# Patient Record
Sex: MALE | Race: WHITE | NOT HISPANIC OR LATINO | URBAN - METROPOLITAN AREA
[De-identification: names, ages, dates, MRNs, and addresses within clinical notes are randomized per-mention and may not be internally consistent; named-entity substitution may affect disease eponyms.]

---

## 2017-05-04 ENCOUNTER — INPATIENT (INPATIENT)
Facility: HOSPITAL | Age: 52
LOS: 1 days | Discharge: HOME | DRG: 812 | End: 2017-05-06
Attending: INTERNAL MEDICINE | Admitting: INTERNAL MEDICINE
Payer: MEDICAID

## 2017-05-04 VITALS
HEART RATE: 101 BPM | OXYGEN SATURATION: 98 % | RESPIRATION RATE: 16 BRPM | DIASTOLIC BLOOD PRESSURE: 81 MMHG | HEIGHT: 73 IN | SYSTOLIC BLOOD PRESSURE: 150 MMHG | TEMPERATURE: 97 F | WEIGHT: 285.06 LBS

## 2017-05-04 DIAGNOSIS — C85.90 NON-HODGKIN LYMPHOMA, UNSPECIFIED, UNSPECIFIED SITE: ICD-10-CM

## 2017-05-04 DIAGNOSIS — D64.9 ANEMIA, UNSPECIFIED: ICD-10-CM

## 2017-05-04 DIAGNOSIS — R06.09 OTHER FORMS OF DYSPNEA: ICD-10-CM

## 2017-05-04 DIAGNOSIS — Z29.9 ENCOUNTER FOR PROPHYLACTIC MEASURES, UNSPECIFIED: ICD-10-CM

## 2017-05-04 DIAGNOSIS — C82.91 FOLLICULAR LYMPHOMA, UNSPECIFIED, LYMPH NODES OF HEAD, FACE, AND NECK: Chronic | ICD-10-CM

## 2017-05-04 DIAGNOSIS — K21.9 GASTRO-ESOPHAGEAL REFLUX DISEASE WITHOUT ESOPHAGITIS: ICD-10-CM

## 2017-05-04 LAB
ALBUMIN SERPL ELPH-MCNC: 2.6 G/DL — LOW (ref 3.5–5)
ALP SERPL-CCNC: 57 U/L — SIGNIFICANT CHANGE UP (ref 40–120)
ALT FLD-CCNC: 35 U/L DA — SIGNIFICANT CHANGE UP (ref 10–60)
ANION GAP SERPL CALC-SCNC: 7 MMOL/L — SIGNIFICANT CHANGE UP (ref 5–17)
APTT BLD: 30.1 SEC — SIGNIFICANT CHANGE UP (ref 27.5–37.4)
AST SERPL-CCNC: 22 U/L — SIGNIFICANT CHANGE UP (ref 10–40)
BASOPHILS # BLD AUTO: 0.1 K/UL — SIGNIFICANT CHANGE UP (ref 0–0.2)
BASOPHILS NFR BLD AUTO: 0.9 % — SIGNIFICANT CHANGE UP (ref 0–2)
BILIRUB SERPL-MCNC: 0.3 MG/DL — SIGNIFICANT CHANGE UP (ref 0.2–1.2)
BUN SERPL-MCNC: 17 MG/DL — SIGNIFICANT CHANGE UP (ref 7–18)
CALCIUM SERPL-MCNC: 7.7 MG/DL — LOW (ref 8.4–10.5)
CHLORIDE SERPL-SCNC: 112 MMOL/L — HIGH (ref 96–108)
CK MB BLD-MCNC: 0.8 % — SIGNIFICANT CHANGE UP (ref 0–3.5)
CK MB CFR SERPL CALC: 1.5 NG/ML — SIGNIFICANT CHANGE UP (ref 0–3.6)
CK SERPL-CCNC: 195 U/L — SIGNIFICANT CHANGE UP (ref 35–232)
CO2 SERPL-SCNC: 25 MMOL/L — SIGNIFICANT CHANGE UP (ref 22–31)
CREAT SERPL-MCNC: 1.22 MG/DL — SIGNIFICANT CHANGE UP (ref 0.5–1.3)
EOSINOPHIL # BLD AUTO: 0.3 K/UL — SIGNIFICANT CHANGE UP (ref 0–0.5)
EOSINOPHIL NFR BLD AUTO: 3.3 % — SIGNIFICANT CHANGE UP (ref 0–6)
FERRITIN SERPL-MCNC: 18 NG/ML — LOW (ref 30–400)
GLUCOSE SERPL-MCNC: 125 MG/DL — HIGH (ref 70–99)
HAPTOGLOB SERPL-MCNC: 158 MG/DL — SIGNIFICANT CHANGE UP (ref 34–200)
HCT VFR BLD CALC: 21.4 % — LOW (ref 39–50)
HCT VFR BLD CALC: 24.2 % — LOW (ref 39–50)
HGB BLD-MCNC: 6.9 G/DL — CRITICAL LOW (ref 13–17)
HGB BLD-MCNC: 7.7 G/DL — LOW (ref 13–17)
INR BLD: 1.05 RATIO — SIGNIFICANT CHANGE UP (ref 0.88–1.16)
IRON SATN MFR SERPL: 20 UG/DL — LOW (ref 65–170)
IRON SATN MFR SERPL: 6 % — LOW (ref 20–55)
LDH SERPL L TO P-CCNC: 215 U/L — SIGNIFICANT CHANGE UP (ref 120–225)
LYMPHOCYTES # BLD AUTO: 1.5 K/UL — SIGNIFICANT CHANGE UP (ref 1–3.3)
LYMPHOCYTES # BLD AUTO: 16.4 % — SIGNIFICANT CHANGE UP (ref 13–44)
MCHC RBC-ENTMCNC: 30.4 PG — SIGNIFICANT CHANGE UP (ref 27–34)
MCHC RBC-ENTMCNC: 31.2 PG — SIGNIFICANT CHANGE UP (ref 27–34)
MCHC RBC-ENTMCNC: 32 GM/DL — SIGNIFICANT CHANGE UP (ref 32–36)
MCHC RBC-ENTMCNC: 32 GM/DL — SIGNIFICANT CHANGE UP (ref 32–36)
MCV RBC AUTO: 95 FL — SIGNIFICANT CHANGE UP (ref 80–100)
MCV RBC AUTO: 97.3 FL — SIGNIFICANT CHANGE UP (ref 80–100)
MONOCYTES # BLD AUTO: 0.8 K/UL — SIGNIFICANT CHANGE UP (ref 0–0.9)
MONOCYTES NFR BLD AUTO: 8.3 % — SIGNIFICANT CHANGE UP (ref 2–14)
NEUTROPHILS # BLD AUTO: 6.7 K/UL — SIGNIFICANT CHANGE UP (ref 1.8–7.4)
NEUTROPHILS NFR BLD AUTO: 71.1 % — SIGNIFICANT CHANGE UP (ref 43–77)
NT-PROBNP SERPL-SCNC: 180 PG/ML — HIGH (ref 0–125)
OB PNL STL: NEGATIVE — SIGNIFICANT CHANGE UP
PLATELET # BLD AUTO: 330 K/UL — SIGNIFICANT CHANGE UP (ref 150–400)
PLATELET # BLD AUTO: 333 K/UL — SIGNIFICANT CHANGE UP (ref 150–400)
POTASSIUM SERPL-MCNC: 4 MMOL/L — SIGNIFICANT CHANGE UP (ref 3.5–5.3)
POTASSIUM SERPL-SCNC: 4 MMOL/L — SIGNIFICANT CHANGE UP (ref 3.5–5.3)
PROT SERPL-MCNC: 5.7 G/DL — LOW (ref 6–8.3)
PROTHROM AB SERPL-ACNC: 11.5 SEC — SIGNIFICANT CHANGE UP (ref 9.8–12.7)
RBC # BLD: 2.07 M/UL — LOW (ref 4.2–5.8)
RBC # BLD: 2.2 M/UL — LOW (ref 4.2–5.8)
RBC # BLD: 2.54 M/UL — LOW (ref 4.2–5.8)
RBC # FLD: 14.8 % — HIGH (ref 10.3–14.5)
RBC # FLD: 15.6 % — HIGH (ref 10.3–14.5)
RETICS #: 173.3 K/UL — HIGH (ref 25–125)
RETICS/RBC NFR: 8.4 % — HIGH (ref 0.5–2.5)
SODIUM SERPL-SCNC: 144 MMOL/L — SIGNIFICANT CHANGE UP (ref 135–145)
TIBC SERPL-MCNC: 340 UG/DL — SIGNIFICANT CHANGE UP (ref 250–450)
TRANSFERRIN SERPL-MCNC: 267 MG/DL — SIGNIFICANT CHANGE UP (ref 200–360)
TROPONIN I SERPL-MCNC: <0.015 NG/ML — SIGNIFICANT CHANGE UP (ref 0–0.04)
UIBC SERPL-MCNC: 320 UG/DL — SIGNIFICANT CHANGE UP (ref 110–370)
WBC # BLD: 9.4 K/UL — SIGNIFICANT CHANGE UP (ref 3.8–10.5)
WBC # BLD: 9.6 K/UL — SIGNIFICANT CHANGE UP (ref 3.8–10.5)
WBC # FLD AUTO: 9.4 K/UL — SIGNIFICANT CHANGE UP (ref 3.8–10.5)
WBC # FLD AUTO: 9.6 K/UL — SIGNIFICANT CHANGE UP (ref 3.8–10.5)

## 2017-05-04 PROCEDURE — 88312 SPECIAL STAINS GROUP 1: CPT | Mod: 26

## 2017-05-04 PROCEDURE — 99223 1ST HOSP IP/OBS HIGH 75: CPT | Mod: GC

## 2017-05-04 PROCEDURE — 71260 CT THORAX DX C+: CPT | Mod: 26

## 2017-05-04 PROCEDURE — 74177 CT ABD & PELVIS W/CONTRAST: CPT | Mod: 26

## 2017-05-04 PROCEDURE — 71020: CPT | Mod: 26

## 2017-05-04 PROCEDURE — 99285 EMERGENCY DEPT VISIT HI MDM: CPT

## 2017-05-04 PROCEDURE — 88305 TISSUE EXAM BY PATHOLOGIST: CPT | Mod: 26

## 2017-05-04 RX ORDER — SODIUM CHLORIDE 9 MG/ML
1000 INJECTION INTRAMUSCULAR; INTRAVENOUS; SUBCUTANEOUS
Qty: 0 | Refills: 0 | Status: DISCONTINUED | OUTPATIENT
Start: 2017-05-04 | End: 2017-05-06

## 2017-05-04 RX ORDER — SOD SULF/SODIUM/NAHCO3/KCL/PEG
4000 SOLUTION, RECONSTITUTED, ORAL ORAL ONCE
Qty: 0 | Refills: 0 | Status: COMPLETED | OUTPATIENT
Start: 2017-05-04 | End: 2017-05-04

## 2017-05-04 RX ORDER — PANTOPRAZOLE SODIUM 20 MG/1
40 TABLET, DELAYED RELEASE ORAL EVERY 12 HOURS
Qty: 0 | Refills: 0 | Status: DISCONTINUED | OUTPATIENT
Start: 2017-05-04 | End: 2017-05-06

## 2017-05-04 RX ORDER — MIRTAZAPINE 45 MG/1
1 TABLET, ORALLY DISINTEGRATING ORAL
Qty: 0 | Refills: 0 | COMMUNITY

## 2017-05-04 RX ORDER — INFLUENZA VIRUS VACCINE 15; 15; 15; 15 UG/.5ML; UG/.5ML; UG/.5ML; UG/.5ML
0.5 SUSPENSION INTRAMUSCULAR ONCE
Qty: 0 | Refills: 0 | Status: COMPLETED | OUTPATIENT
Start: 2017-05-04 | End: 2017-05-04

## 2017-05-04 RX ADMIN — PANTOPRAZOLE SODIUM 40 MILLIGRAM(S): 20 TABLET, DELAYED RELEASE ORAL at 18:55

## 2017-05-04 RX ADMIN — Medication 4000 MILLILITER(S): at 23:13

## 2017-05-04 RX ADMIN — Medication 20 MILLIGRAM(S): at 22:58

## 2017-05-04 RX ADMIN — SODIUM CHLORIDE 100 MILLILITER(S): 9 INJECTION INTRAMUSCULAR; INTRAVENOUS; SUBCUTANEOUS at 18:55

## 2017-05-04 NOTE — PATIENT PROFILE ADULT. - VISION (WITH CORRECTIVE LENSES IF THE PATIENT USUALLY WEARS THEM):
Normal vision: sees adequately in most situations; can see medication labels, newsprint/WEARS GLASSES

## 2017-05-04 NOTE — H&P ADULT. - HISTORY OF PRESENT ILLNESS
Patient is a 52 y/o male with PMHx of Follicular Lymphoma S/p surgery (no chemo or radiation) , gerd was sent in by pmd to the ED for low hemoglobin on labs done on may 1 . As per pmd patient came to see her on may 1 for complaints of worsening shortness of breath , fatigue , crampy abdominal discomfort and increased frequency of bowel movements . Patient gives a similar history and in addition denies any loose stools, blood in stool/vomitus/urine , easy bruising,fevers, weight loss, night sweats , worsening abdominal distention or any other symptoms. He also denies any chest pain, palpitations,swelling of legs.No fevers , chills or dysuria.No cough or plueritic chest pain. no nausea/vomiting . No recent travel or sick contacts . He however admits to some weight gain in past few months.Patient has history of follicular lymphoma stage 1 for which he had a surgery 4 years ago without any chemotherapy or radiation and has not seen his oncologist in a couple of years. Family h/o  significant for prostrate malignancy in father . Patient is a non smoker & non alcoholic, lives with friends and ambulates normally at baseline . Last colonoscopy 5 years ago and last endoscopy 3 years ago are both normal.As per pmd patient does not have any other complaints and only medication prescribed is ranitidine.

## 2017-05-04 NOTE — H&P ADULT. - NEGATIVE OPHTHALMOLOGIC SYMPTOMS
no diplopia/no photophobia/no blurred vision L/no lacrimation R/no blurred vision R/no lacrimation L

## 2017-05-04 NOTE — H&P ADULT. - NEGATIVE NEUROLOGICAL SYMPTOMS
no hemiparesis/no focal seizures/no loss of consciousness/no weakness/no difficulty walking/no syncope/no facial palsy/no loss of sensation/no tremors/no headache/no paresthesias/no transient paralysis/no vertigo/no confusion/no generalized seizures

## 2017-05-04 NOTE — H&P ADULT. - ASSESSMENT
Patient is a 52 y/o male with PMHx of Follicular Lymphoma S/p surgery (no chemo or radiation) , gerd was sent in by pmd to the ED for low hemoglobin on labs done on may 1 associated with fatigue and dyspnea on exertion. In the ed vitals are significant for mild tachycardia on admission with a normal blood pressure. Physical exam is significant for softly distended abdomen with bowel sounds present . Labs are significant for h/h of 6.9/21.4 with normal liver function tests and coagulation profile. Patient is admitted for symptomatic anemia requiring blood transfusion and further work up

## 2017-05-04 NOTE — H&P ADULT. - NEGATIVE GASTROINTESTINAL SYMPTOMS
no steatorrhea/no constipation/no abdominal pain/no change in bowel habits/no hiccoughs/no jaundice/no nausea/no vomiting/no hematochezia/no melena/no flatulence/no diarrhea

## 2017-05-04 NOTE — H&P ADULT. - MUSCULOSKELETAL
details… detailed exam ROM intact/no joint swelling/no joint warmth/no joint erythema/normal strength/normal/no calf tenderness

## 2017-05-04 NOTE — H&P ADULT. - PROBLEM SELECTOR PLAN 1
Normocytic anemia with H/H of 6.9/21.4 on admission  Concern for gi bleed versus secondary to malignancy  Glasglow blatchford score of 7 indicating need for further intervention nessecary including endoscopy and blood transfusion   baseline Hemoglobin for patient from march is 15 .Hb done 5 days ago in pmd office is 7.2   Fecal Occult blood test is negative both in ed and pmd office  Peripheral smear does not show any significant abnormalities  Follow-up anemia panel  Keep NPO for now  Patient is currently getting endoscopy by dr Barraza. Advance diet per GI  F/u ct abdomen, neck and chest with iv and po contrast to evaluate for malignancy  Started on protonix 40 IV Q 12  Will monitor CBC Q12  Patient to get 2 U PRBC . Follow up post transfusion cbc  Will continue Iv fluids as Orthostatic's weakly positive and repeat Orthostatic's S/p fluid resuscitation  Dr clemens to be consulted

## 2017-05-04 NOTE — H&P ADULT. - PROBLEM SELECTOR PLAN 5
Dvt & gi prophylaxis - improve score = 1. Will hold chemical dvt prophylaxis as concern for gi bleed

## 2017-05-04 NOTE — H&P ADULT. - GASTROINTESTINAL DETAILS
bowel sounds normal/no rigidity/normal/no rebound tenderness/no distention/no guarding/soft/nontender/no masses palpable

## 2017-05-04 NOTE — H&P ADULT. - NEUROLOGICAL DETAILS
responds to verbal commands/superficial reflexes intact/cranial nerves intact/deep reflexes intact/normal strength/alert and oriented x 3/responds to pain/sensation intact/no spontaneous movement

## 2017-05-04 NOTE — H&P ADULT. - NEGATIVE ENMT SYMPTOMS
no sinus symptoms/no vertigo/no nasal congestion/no tinnitus/no hearing difficulty/no ear pain/no nasal discharge

## 2017-05-04 NOTE — H&P ADULT. - PROBLEM SELECTOR PLAN 3
Patient has history of follicular lymphoma stage 1 for which he had a surgery 4 years ago without any chemotherapy or radiation and has not seen his oncologist in a couple of years.  F/u ct abdomen, neck and chest with iv and po contrast to evaluate for malignancy . Dr clemens to be consulted

## 2017-05-04 NOTE — H&P ADULT. - ATTENDING COMMENTS
Patient seen and examined and d/w PGY2 MAR Dr. Ball presentation    Patient is a 50 y/o male with PMHx of Follicular Lymphoma S/p surgery (no chemo or radiation) , gerd was sent in by pmd to the ED for low hemoglobin on labs done on may 1 . As per pmd patient came to see her on may 1 for complaints of worsening shortness of breath , fatigue , crampy abdominal discomfort and increased frequency of bowel movements . Patient gives a similar history and in addition denies any loose stools, blood in stool/vomitus/urine , easy bruising,fevers, weight loss, night sweats , worsening abdominal distention or any other symptoms. He also denies any chest pain, palpitations,swelling of legs.No fevers , chills or dysuria.No cough or plueritic chest pain. no nausea/vomiting . No recent travel or sick contacts . He however admits to some weight gain in past few months.Patient has history of follicular lymphoma stage 1 for which he had a surgery 4 years ago without any chemotherapy or radiation and has not seen his oncologist in a couple of years. Family h/o  significant for prostrate malignancy in father . Patient is a non smoker & non alcoholic, lives with friends and ambulates normally at baseline . Last colonoscopy 5 years ago and last endoscopy 3 years ago are both normal. As per pmd patient does not have any other complaints and only medication prescribed is ranitidine.    Patient was seen by GI this afternoon underwent EGD showed gastric ulcer non bleeding. Patient was on ranitidine at home.  Patient denies any blood in stool or dark stool.     Patient found to have very low H/H 7/21 in ED, Anemia panel requested and currently getting first unit of blood.    D/D:  Symptomatic Anemia etiology unclear GI bleed versus Lymphoma   versus Bone marrow etiology  gastric ulcer concern for H. Pylori with underlying MALT/ GIST etc    Plan:  Admit to Medicine; NPO; CT Neck, Chest, Abdomen and Pelvis urgent  s/p EGD with no active bleed; d/w GI for Colonoscopy tomorrow. Bowel prep after CT  Hematology evaluation d/w Dr. Andrew Smith. Transfuse 2 units pRBC; Keep H/H around 9/27;  No Lovenox or heparin due to low H/H; SCD boots    Discussed with PGY2 MAR plan of care; d/w Floor RN  Discussed with Patient findings, plan of care Patient seen and examined and d/w PGY2 MAR Dr. Ball presentation    Patient is a 52 y/o male with PMHx of Follicular Lymphoma S/p surgery (no chemo or radiation) , gerd was sent in by pmd to the ED for low hemoglobin on labs done on may 1 . As per pmd patient came to see her on may 1 for complaints of worsening shortness of breath , fatigue , crampy abdominal discomfort and increased frequency of bowel movements . Patient gives a similar history and in addition denies any loose stools, blood in stool/vomitus/urine , easy bruising,fevers, weight loss, night sweats , worsening abdominal distention or any other symptoms. He also denies any chest pain, palpitations,swelling of legs.No fevers , chills or dysuria.No cough or plueritic chest pain. no nausea/vomiting . No recent travel or sick contacts . He however admits to some weight gain in past few months.Patient has history of follicular lymphoma stage 1 for which he had a surgery 4 years ago without any chemotherapy or radiation and has not seen his oncologist in a couple of years. Family h/o  significant for prostrate malignancy in father . Patient is a non smoker & non alcoholic, lives with friends and ambulates normally at baseline . Last colonoscopy 5 years ago and last endoscopy 3 years ago are both normal. As per pmd patient does not have any other complaints and only medication prescribed is ranitidine.    Patient was seen by GI this afternoon underwent EGD showed gastric ulcer non bleeding. Patient was on ranitidine at home.  Patient denies any blood in stool or dark stool.     Patient found to have very low H/H 7/21 in ED, Anemia panel requested and currently getting first unit of blood.    Labs: reviewed; low H/H; FOBT Negative; BMP WNL    D/D:  Symptomatic Anemia etiology unclear GI bleed versus Lymphoma   versus Bone marrow etiology  gastric ulcer concern for H. Pylori with underlying MALT/ GIST etc    Plan:  Admit to Medicine; NPO; CT Neck, Chest, Abdomen and Pelvis urgent  s/p EGD with no active bleed; d/w GI for Colonoscopy tomorrow. Bowel prep after CT  Hematology evaluation d/w Dr. Andrew Smith. Transfuse 2 units pRBC; Keep H/H around 9/27;  No Lovenox or heparin due to low H/H; SCD boots    Discussed with PGY2 MAR plan of care; d/w Floor RN  Discussed with Patient findings, plan of care Patient seen and examined and d/w PGY2 EMILIE Ball presentation    Patient is a 50 y/o male with PMHx of Follicular Lymphoma S/p surgery (no chemo or radiation) , gerd was sent in by pmd to the ED for low hemoglobin on labs done on may 1 . As per pmd patient came to see her on may 1 for complaints of worsening shortness of breath , fatigue , crampy abdominal discomfort and increased frequency of bowel movements . Patient gives a similar history and in addition denies any loose stools, blood in stool/vomitus/urine , easy bruising,fevers, weight loss, night sweats , worsening abdominal distention or any other symptoms. He also denies any chest pain, palpitations,swelling of legs.No fevers , chills or dysuria.No cough or plueritic chest pain. no nausea/vomiting . No recent travel or sick contacts . He however admits to some weight gain in past few months.Patient has history of follicular lymphoma stage 1 for which he had a surgery 4 years ago without any chemotherapy or radiation and has not seen his oncologist in a couple of years. Family h/o  significant for prostrate malignancy in father . Patient is a non smoker & non alcoholic, lives with friends and ambulates normally at baseline . Last colonoscopy 5 years ago and last endoscopy 3 years ago are both normal. As per pmd patient does not have any other complaints and only medication prescribed is ranitidine.    Patient was seen by GI this afternoon underwent EGD showed gastric ulcer non bleeding. Patient was on ranitidine at home.  Patient denies any blood in stool or dark stool.     Patient found to have very low H/H 7/21 in ED, Anemia panel requested and currently getting first unit of blood.    Labs: reviewed; low H/H; FOBT Negative; BMP WNL    D/D:  Symptomatic Anemia etiology unclear GI bleed versus Lymphoma   versus Bone marrow etiology  gastric ulcer concern for H. Pylori with underlying MALT/ GIST etc    Plan:  Admit to Medicine; NPO; CT Neck, Chest, Abdomen and Pelvis urgent  s/p EGD with no active bleed; d/w GI for Colonoscopy tomorrow. Bowel prep after CT  Hematology evaluation d/w Dr. Andrew Smith. Transfuse 2 units pRBC; Keep H/H around 9/27;  No Lovenox or heparin due to low H/H; SCD boots  If no evident etiology on endoscopy and labs, will need Bone marrow Biopsy    Discussed with PGY2 MAR plan of care; d/w Floor RN  Discussed with Patient findings, plan of care

## 2017-05-04 NOTE — ED PROVIDER NOTE - OBJECTIVE STATEMENT
52 y/o male with PMHx of Folliculare Lymphoma (previously on chemo and radiation) presents to the ED for low hemoglobin today. Pt reports that for 2 days ago he went to his PMD for feeling weak and tired, he then had his blood drawn and today got a call stating that his hemoglobin was very low. Pt was sent by PMD to the ED for transfusion today. Pt denies fever, chills, weight loss, night sweats, similar episodes in the past, or any other complaints. NKDA.

## 2017-05-04 NOTE — H&P ADULT. - NEGATIVE CARDIOVASCULAR SYMPTOMS
no palpitations/no paroxysmal nocturnal dyspnea/no chest pain/no claudication/no peripheral edema/no orthopnea

## 2017-05-05 LAB
ALBUMIN SERPL ELPH-MCNC: 2.7 G/DL — LOW (ref 3.5–5)
ALP SERPL-CCNC: 57 U/L — SIGNIFICANT CHANGE UP (ref 40–120)
ALT FLD-CCNC: 34 U/L DA — SIGNIFICANT CHANGE UP (ref 10–60)
ANION GAP SERPL CALC-SCNC: 7 MMOL/L — SIGNIFICANT CHANGE UP (ref 5–17)
AST SERPL-CCNC: 19 U/L — SIGNIFICANT CHANGE UP (ref 10–40)
BASOPHILS # BLD AUTO: 0.1 K/UL — SIGNIFICANT CHANGE UP (ref 0–0.2)
BASOPHILS NFR BLD AUTO: 0.7 % — SIGNIFICANT CHANGE UP (ref 0–2)
BILIRUB SERPL-MCNC: 0.5 MG/DL — SIGNIFICANT CHANGE UP (ref 0.2–1.2)
BUN SERPL-MCNC: 10 MG/DL — SIGNIFICANT CHANGE UP (ref 7–18)
CALCIUM SERPL-MCNC: 7.7 MG/DL — LOW (ref 8.4–10.5)
CHLORIDE SERPL-SCNC: 110 MMOL/L — HIGH (ref 96–108)
CHOLEST SERPL-MCNC: 162 MG/DL — SIGNIFICANT CHANGE UP (ref 10–199)
CO2 SERPL-SCNC: 26 MMOL/L — SIGNIFICANT CHANGE UP (ref 22–31)
CREAT SERPL-MCNC: 1.05 MG/DL — SIGNIFICANT CHANGE UP (ref 0.5–1.3)
EOSINOPHIL # BLD AUTO: 0.3 K/UL — SIGNIFICANT CHANGE UP (ref 0–0.5)
EOSINOPHIL NFR BLD AUTO: 3.7 % — SIGNIFICANT CHANGE UP (ref 0–6)
GLUCOSE SERPL-MCNC: 84 MG/DL — SIGNIFICANT CHANGE UP (ref 70–99)
HBA1C BLD-MCNC: 5.5 % — SIGNIFICANT CHANGE UP (ref 4–5.6)
HCT VFR BLD CALC: 24.3 % — LOW (ref 39–50)
HCT VFR BLD CALC: 27.3 % — LOW (ref 39–50)
HDLC SERPL-MCNC: 30 MG/DL — LOW (ref 40–125)
HGB BLD-MCNC: 7.8 G/DL — LOW (ref 13–17)
HGB BLD-MCNC: 8.8 G/DL — LOW (ref 13–17)
INR BLD: 1.07 RATIO — SIGNIFICANT CHANGE UP (ref 0.88–1.16)
LIPID PNL WITH DIRECT LDL SERPL: 108 MG/DL — SIGNIFICANT CHANGE UP
LYMPHOCYTES # BLD AUTO: 1.6 K/UL — SIGNIFICANT CHANGE UP (ref 1–3.3)
LYMPHOCYTES # BLD AUTO: 17.2 % — SIGNIFICANT CHANGE UP (ref 13–44)
MAGNESIUM SERPL-MCNC: 2.5 MG/DL — HIGH (ref 1.8–2.4)
MCHC RBC-ENTMCNC: 30.4 PG — SIGNIFICANT CHANGE UP (ref 27–34)
MCHC RBC-ENTMCNC: 30.6 PG — SIGNIFICANT CHANGE UP (ref 27–34)
MCHC RBC-ENTMCNC: 32.1 GM/DL — SIGNIFICANT CHANGE UP (ref 32–36)
MCHC RBC-ENTMCNC: 32.3 GM/DL — SIGNIFICANT CHANGE UP (ref 32–36)
MCV RBC AUTO: 94 FL — SIGNIFICANT CHANGE UP (ref 80–100)
MCV RBC AUTO: 95.3 FL — SIGNIFICANT CHANGE UP (ref 80–100)
MONOCYTES # BLD AUTO: 0.8 K/UL — SIGNIFICANT CHANGE UP (ref 0–0.9)
MONOCYTES NFR BLD AUTO: 8.6 % — SIGNIFICANT CHANGE UP (ref 2–14)
NEUTROPHILS # BLD AUTO: 6.3 K/UL — SIGNIFICANT CHANGE UP (ref 1.8–7.4)
NEUTROPHILS NFR BLD AUTO: 69.8 % — SIGNIFICANT CHANGE UP (ref 43–77)
PHOSPHATE SERPL-MCNC: 2 MG/DL — LOW (ref 2.5–4.5)
PLATELET # BLD AUTO: 345 K/UL — SIGNIFICANT CHANGE UP (ref 150–400)
PLATELET # BLD AUTO: 362 K/UL — SIGNIFICANT CHANGE UP (ref 150–400)
POTASSIUM SERPL-MCNC: 3.9 MMOL/L — SIGNIFICANT CHANGE UP (ref 3.5–5.3)
POTASSIUM SERPL-SCNC: 3.9 MMOL/L — SIGNIFICANT CHANGE UP (ref 3.5–5.3)
PROT SERPL-MCNC: 5.8 G/DL — LOW (ref 6–8.3)
PROTHROM AB SERPL-ACNC: 11.7 SEC — SIGNIFICANT CHANGE UP (ref 9.8–12.7)
RBC # BLD: 2.55 M/UL — LOW (ref 4.2–5.8)
RBC # BLD: 2.9 M/UL — LOW (ref 4.2–5.8)
RBC # FLD: 15.3 % — HIGH (ref 10.3–14.5)
RBC # FLD: 15.7 % — HIGH (ref 10.3–14.5)
SODIUM SERPL-SCNC: 143 MMOL/L — SIGNIFICANT CHANGE UP (ref 135–145)
TOTAL CHOLESTEROL/HDL RATIO MEASUREMENT: 5.4 RATIO — SIGNIFICANT CHANGE UP (ref 3.4–9.6)
TRIGL SERPL-MCNC: 122 MG/DL — SIGNIFICANT CHANGE UP (ref 10–149)
TSH SERPL-MCNC: 3.84 UU/ML — SIGNIFICANT CHANGE UP (ref 0.34–4.82)
VIT B12 SERPL-MCNC: 424 PG/ML — SIGNIFICANT CHANGE UP (ref 243–894)
WBC # BLD: 9.1 K/UL — SIGNIFICANT CHANGE UP (ref 3.8–10.5)
WBC # BLD: 9.3 K/UL — SIGNIFICANT CHANGE UP (ref 3.8–10.5)
WBC # FLD AUTO: 9.1 K/UL — SIGNIFICANT CHANGE UP (ref 3.8–10.5)
WBC # FLD AUTO: 9.3 K/UL — SIGNIFICANT CHANGE UP (ref 3.8–10.5)

## 2017-05-05 PROCEDURE — 99232 SBSQ HOSP IP/OBS MODERATE 35: CPT | Mod: GC

## 2017-05-05 RX ORDER — POTASSIUM PHOSPHATE, MONOBASIC POTASSIUM PHOSPHATE, DIBASIC 236; 224 MG/ML; MG/ML
15 INJECTION, SOLUTION INTRAVENOUS ONCE
Qty: 0 | Refills: 0 | Status: COMPLETED | OUTPATIENT
Start: 2017-05-05 | End: 2017-05-05

## 2017-05-05 RX ORDER — SODIUM,POTASSIUM PHOSPHATES 278-250MG
1 POWDER IN PACKET (EA) ORAL
Qty: 0 | Refills: 0 | Status: DISCONTINUED | OUTPATIENT
Start: 2017-05-05 | End: 2017-05-05

## 2017-05-05 RX ADMIN — PANTOPRAZOLE SODIUM 40 MILLIGRAM(S): 20 TABLET, DELAYED RELEASE ORAL at 05:44

## 2017-05-05 RX ADMIN — Medication 20 MILLIGRAM(S): at 21:54

## 2017-05-05 RX ADMIN — POTASSIUM PHOSPHATE, MONOBASIC POTASSIUM PHOSPHATE, DIBASIC 62.5 MILLIMOLE(S): 236; 224 INJECTION, SOLUTION INTRAVENOUS at 09:20

## 2017-05-05 RX ADMIN — PANTOPRAZOLE SODIUM 40 MILLIGRAM(S): 20 TABLET, DELAYED RELEASE ORAL at 18:24

## 2017-05-06 VITALS
OXYGEN SATURATION: 99 % | DIASTOLIC BLOOD PRESSURE: 71 MMHG | SYSTOLIC BLOOD PRESSURE: 139 MMHG | HEART RATE: 97 BPM | TEMPERATURE: 98 F | RESPIRATION RATE: 15 BRPM

## 2017-05-06 LAB
ANION GAP SERPL CALC-SCNC: 6 MMOL/L — SIGNIFICANT CHANGE UP (ref 5–17)
BUN SERPL-MCNC: 9 MG/DL — SIGNIFICANT CHANGE UP (ref 7–18)
CALCIUM SERPL-MCNC: 7.9 MG/DL — LOW (ref 8.4–10.5)
CHLORIDE SERPL-SCNC: 110 MMOL/L — HIGH (ref 96–108)
CO2 SERPL-SCNC: 28 MMOL/L — SIGNIFICANT CHANGE UP (ref 22–31)
CREAT SERPL-MCNC: 1.13 MG/DL — SIGNIFICANT CHANGE UP (ref 0.5–1.3)
DIR ANTIGLOB POLYSPECIFIC INTERPRETATION: SIGNIFICANT CHANGE UP
ERYTHROCYTE [SEDIMENTATION RATE] IN BLOOD: 34 MM/HR — HIGH (ref 0–20)
GLUCOSE SERPL-MCNC: 79 MG/DL — SIGNIFICANT CHANGE UP (ref 70–99)
HCT VFR BLD CALC: 27.4 % — LOW (ref 39–50)
HGB BLD-MCNC: 9 G/DL — LOW (ref 13–17)
MCHC RBC-ENTMCNC: 31 PG — SIGNIFICANT CHANGE UP (ref 27–34)
MCHC RBC-ENTMCNC: 32.8 GM/DL — SIGNIFICANT CHANGE UP (ref 32–36)
MCV RBC AUTO: 94.3 FL — SIGNIFICANT CHANGE UP (ref 80–100)
PLATELET # BLD AUTO: 364 K/UL — SIGNIFICANT CHANGE UP (ref 150–400)
POTASSIUM SERPL-MCNC: 4 MMOL/L — SIGNIFICANT CHANGE UP (ref 3.5–5.3)
POTASSIUM SERPL-SCNC: 4 MMOL/L — SIGNIFICANT CHANGE UP (ref 3.5–5.3)
RBC # BLD: 2.91 M/UL — LOW (ref 4.2–5.8)
RBC # FLD: 15.5 % — HIGH (ref 10.3–14.5)
SODIUM SERPL-SCNC: 144 MMOL/L — SIGNIFICANT CHANGE UP (ref 135–145)
WBC # BLD: 9.8 K/UL — SIGNIFICANT CHANGE UP (ref 3.8–10.5)
WBC # FLD AUTO: 9.8 K/UL — SIGNIFICANT CHANGE UP (ref 3.8–10.5)

## 2017-05-06 PROCEDURE — 86850 RBC ANTIBODY SCREEN: CPT

## 2017-05-06 PROCEDURE — P9040: CPT

## 2017-05-06 PROCEDURE — 85652 RBC SED RATE AUTOMATED: CPT

## 2017-05-06 PROCEDURE — 80053 COMPREHEN METABOLIC PANEL: CPT

## 2017-05-06 PROCEDURE — 70491 CT SOFT TISSUE NECK W/DYE: CPT | Mod: 26

## 2017-05-06 PROCEDURE — 85610 PROTHROMBIN TIME: CPT

## 2017-05-06 PROCEDURE — 84466 ASSAY OF TRANSFERRIN: CPT

## 2017-05-06 PROCEDURE — 86900 BLOOD TYPING SEROLOGIC ABO: CPT

## 2017-05-06 PROCEDURE — 80048 BASIC METABOLIC PNL TOTAL CA: CPT

## 2017-05-06 PROCEDURE — 85027 COMPLETE CBC AUTOMATED: CPT

## 2017-05-06 PROCEDURE — 80061 LIPID PANEL: CPT

## 2017-05-06 PROCEDURE — 71046 X-RAY EXAM CHEST 2 VIEWS: CPT

## 2017-05-06 PROCEDURE — 86901 BLOOD TYPING SEROLOGIC RH(D): CPT

## 2017-05-06 PROCEDURE — 88312 SPECIAL STAINS GROUP 1: CPT

## 2017-05-06 PROCEDURE — 84100 ASSAY OF PHOSPHORUS: CPT

## 2017-05-06 PROCEDURE — 82607 VITAMIN B-12: CPT

## 2017-05-06 PROCEDURE — 83615 LACTATE (LD) (LDH) ENZYME: CPT

## 2017-05-06 PROCEDURE — 71260 CT THORAX DX C+: CPT

## 2017-05-06 PROCEDURE — 36430 TRANSFUSION BLD/BLD COMPNT: CPT

## 2017-05-06 PROCEDURE — 82553 CREATINE MB FRACTION: CPT

## 2017-05-06 PROCEDURE — 82550 ASSAY OF CK (CPK): CPT

## 2017-05-06 PROCEDURE — 70491 CT SOFT TISSUE NECK W/DYE: CPT

## 2017-05-06 PROCEDURE — 93005 ELECTROCARDIOGRAM TRACING: CPT

## 2017-05-06 PROCEDURE — 86880 COOMBS TEST DIRECT: CPT

## 2017-05-06 PROCEDURE — 82728 ASSAY OF FERRITIN: CPT

## 2017-05-06 PROCEDURE — 83036 HEMOGLOBIN GLYCOSYLATED A1C: CPT

## 2017-05-06 PROCEDURE — 86920 COMPATIBILITY TEST SPIN: CPT

## 2017-05-06 PROCEDURE — 74177 CT ABD & PELVIS W/CONTRAST: CPT

## 2017-05-06 PROCEDURE — 85730 THROMBOPLASTIN TIME PARTIAL: CPT

## 2017-05-06 PROCEDURE — 99285 EMERGENCY DEPT VISIT HI MDM: CPT | Mod: 25

## 2017-05-06 PROCEDURE — 84443 ASSAY THYROID STIM HORMONE: CPT

## 2017-05-06 PROCEDURE — 82272 OCCULT BLD FECES 1-3 TESTS: CPT

## 2017-05-06 PROCEDURE — 83880 ASSAY OF NATRIURETIC PEPTIDE: CPT

## 2017-05-06 PROCEDURE — 85045 AUTOMATED RETICULOCYTE COUNT: CPT

## 2017-05-06 PROCEDURE — 88305 TISSUE EXAM BY PATHOLOGIST: CPT

## 2017-05-06 PROCEDURE — 84484 ASSAY OF TROPONIN QUANT: CPT

## 2017-05-06 PROCEDURE — 83010 ASSAY OF HAPTOGLOBIN QUANT: CPT

## 2017-05-06 PROCEDURE — 93306 TTE W/DOPPLER COMPLETE: CPT

## 2017-05-06 PROCEDURE — 83735 ASSAY OF MAGNESIUM: CPT

## 2017-05-06 PROCEDURE — 83550 IRON BINDING TEST: CPT

## 2017-05-06 RX ORDER — PANTOPRAZOLE SODIUM 20 MG/1
1 TABLET, DELAYED RELEASE ORAL
Qty: 30 | Refills: 0 | OUTPATIENT
Start: 2017-05-06 | End: 2017-06-05

## 2017-05-06 RX ORDER — PANTOPRAZOLE SODIUM 20 MG/1
2 TABLET, DELAYED RELEASE ORAL
Qty: 60 | Refills: 0 | OUTPATIENT
Start: 2017-05-06 | End: 2017-06-05

## 2017-05-06 RX ORDER — RANITIDINE HYDROCHLORIDE 150 MG/1
1 TABLET, FILM COATED ORAL
Qty: 0 | Refills: 0 | COMMUNITY

## 2017-05-06 RX ORDER — PANTOPRAZOLE SODIUM 20 MG/1
1 TABLET, DELAYED RELEASE ORAL
Qty: 14 | Refills: 0 | OUTPATIENT
Start: 2017-05-06 | End: 2017-05-20

## 2017-05-06 RX ADMIN — PANTOPRAZOLE SODIUM 40 MILLIGRAM(S): 20 TABLET, DELAYED RELEASE ORAL at 06:11

## 2017-05-06 NOTE — DISCHARGE NOTE ADULT - CARE PLAN
Principal Discharge DX:	Gastroesophageal reflux disease, esophagitis presence not specified  Goal:	Prevent Gi bleed  Instructions for follow-up, activity and diet:	You HB is stable now. Ct abdomen: multiple subcentimeter mesenteric lymph nodes. CT neck: No enlarged cervical adenopathy. Continue protonix for 14 days and follow up with your PCP. Colonoscopy shows some non bleeding hemorrhoids. Gi Dr Barraza recommneded Mr enterography, can be done as outpatient. Oncologist Dr Page  consulted, recommended candie test and esr and patient should follow up with his outpatient oncologist Dr Beard as outpatient and have a PET scan done.  Secondary Diagnosis:	Follicular lymphoma of lymph nodes of neck  Goal:	Prevent exacerbation  Instructions for follow-up, activity and diet:	Continue treatment as per oncologist Dr Beard

## 2017-05-06 NOTE — DISCHARGE NOTE ADULT - MEDICATION SUMMARY - MEDICATIONS TO TAKE
I will START or STAY ON the medications listed below when I get home from the hospital:    Protonix 40 mg oral granule, enteric coated  -- 1 each by mouth once a day  -- It is very important that you take or use this exactly as directed.  Do not skip doses or discontinue unless directed by your doctor.  Obtain medical advice before taking any non-prescription drugs as some may affect the action of this medication.    -- Indication: For GI bleed

## 2017-05-06 NOTE — DISCHARGE NOTE ADULT - PROVIDER TOKENS
FREE:[LAST:[Dr Beard],PHONE:[(   )    -],FAX:[(   )    -]],FREE:[LAST:[Kisha],FIRST:[Sancho],PHONE:[(   )    -],FAX:[(   )    -],ADDRESS:[[address] [place] [address]  Phone: [phone]]]

## 2017-05-06 NOTE — DISCHARGE NOTE ADULT - MEDICATION SUMMARY - MEDICATIONS TO STOP TAKING
I will STOP taking the medications listed below when I get home from the hospital:    raNITIdine 150 mg oral capsule  -- 1 tab(s) by mouth once a day

## 2017-05-06 NOTE — DISCHARGE NOTE ADULT - PLAN OF CARE
You HB is stable now. Ct abdomen: multiple subcentimeter mesenteric lymph nodes. CT neck: No enlarged cervical adenopathy. Continue protonix for 14 days and follow up with your PCP. Colonoscopy shows some non bleeding hemorrhoids. Gi Dr Barraza recommneded Mr enterography, can be done as outpatient. Oncologist Dr Page  consulted, recommended candie test and esr and patient should follow up with his outpatient oncologist Dr Beard as outpatient and have a PET scan done. Continue treatment as per oncologist Dr Beard Prevent exacerbation Prevent Gi bleed

## 2017-05-06 NOTE — DISCHARGE NOTE ADULT - CARE PROVIDER_API CALL
Dr Beard,   Phone: (   )    -  Fax: (   )    -    Sancho Beard  [address] [place] [address]  Phone: [phone]  Phone: (   )    -  Fax: (   )    -

## 2017-05-06 NOTE — DISCHARGE NOTE ADULT - HOSPITAL COURSE
[address]    Patient is a 52 y/o male with PMHx of Follicular Lymphoma S/p surgery (no chemo or radiation) , gerd was sent in by pmd to the ED for low hemoglobin on labs done on may 1 associated with fatigue and dyspnea on exertion. In the ed vitals are significant for mild tachycardia on admission with a normal blood pressure. Physical exam is significant for softly distended abdomen with bowel sounds present . Labs are significant for h/h of 6.9/21.4 with normal liver function tests and coagulation profile. Patient is admitted for symptomatic anemia requiring blood transfusion and further work up    Anemia.    H/h respoded properly to 2 prbc. HB stable  Concern for gi bleed versus secondary to malignancy   baseline Hemoglobin for patient from march is 15 .Hb done 5 days ago in pmd office is 7.2  FOBT -ve, low Ferritin, normal TIBC  Gi Dr Barraza. Advance diet per GI  Ct abdomen: multiple subcentimeter mesenteric lymph nodes  CT neck: No enlarged cervical adenopathy.  Started on protonix 40 IV Q 12, changed to oral on Discharge  Colonoscopy shows some non bleeding hemorrhoids  Gi recommneded Mr enterography, can be done as outpatient  Dr Page  consulted, recommended candie test and esr and patient should follow up with his Dr Beard as outpatient and have a PET scan done      Dyspnea on exertion.    Likely secondary to symptomatic anemia  SOB significantly improved    Lymphoma.    Patient has history of follicular lymphoma stage 1 for which he had a surgery 4 years ago without any chemotherapy or radiation and has not seen his oncologist in a couple of years.    Gastroesophageal reflux disease, continue with protonix for 14 days and follow up with PCP

## 2017-05-06 NOTE — DISCHARGE NOTE ADULT - PATIENT PORTAL LINK FT
“You can access the [facility] Patient Portal, offered by [facility], by registering with the following website: [email or web address]”

## 2017-05-09 DIAGNOSIS — D64.9 ANEMIA, UNSPECIFIED: ICD-10-CM

## 2017-05-09 DIAGNOSIS — K64.8 OTHER HEMORRHOIDS: ICD-10-CM

## 2017-05-09 DIAGNOSIS — Z85.72 PERSONAL HISTORY OF NON-HODGKIN LYMPHOMAS: ICD-10-CM

## 2017-05-09 DIAGNOSIS — R00.0 TACHYCARDIA, UNSPECIFIED: ICD-10-CM

## 2017-05-09 DIAGNOSIS — K21.9 GASTRO-ESOPHAGEAL REFLUX DISEASE WITHOUT ESOPHAGITIS: ICD-10-CM

## 2017-05-09 DIAGNOSIS — K44.9 DIAPHRAGMATIC HERNIA WITHOUT OBSTRUCTION OR GANGRENE: ICD-10-CM

## 2017-05-09 DIAGNOSIS — K25.9 GASTRIC ULCER, UNSPECIFIED AS ACUTE OR CHRONIC, WITHOUT HEMORRHAGE OR PERFORATION: ICD-10-CM

## 2019-06-27 ENCOUNTER — EMERGENCY (EMERGENCY)
Facility: HOSPITAL | Age: 54
LOS: 1 days | Discharge: HOME | End: 2019-06-27
Attending: EMERGENCY MEDICINE
Payer: MEDICAID

## 2019-06-27 VITALS
HEART RATE: 95 BPM | DIASTOLIC BLOOD PRESSURE: 107 MMHG | SYSTOLIC BLOOD PRESSURE: 158 MMHG | TEMPERATURE: 98 F | OXYGEN SATURATION: 98 % | RESPIRATION RATE: 20 BRPM | WEIGHT: 300.05 LBS | HEIGHT: 73 IN

## 2019-06-27 DIAGNOSIS — C82.91 FOLLICULAR LYMPHOMA, UNSPECIFIED, LYMPH NODES OF HEAD, FACE, AND NECK: Chronic | ICD-10-CM

## 2019-06-27 LAB
ALBUMIN SERPL ELPH-MCNC: 3.8 G/DL — SIGNIFICANT CHANGE UP (ref 3.5–5)
ALP SERPL-CCNC: 80 U/L — SIGNIFICANT CHANGE UP (ref 40–120)
ALT FLD-CCNC: 27 U/L DA — SIGNIFICANT CHANGE UP (ref 10–60)
ANION GAP SERPL CALC-SCNC: 10 MMOL/L — SIGNIFICANT CHANGE UP (ref 5–17)
APPEARANCE UR: CLEAR — SIGNIFICANT CHANGE UP
APTT BLD: 31.5 SEC — SIGNIFICANT CHANGE UP (ref 27.5–36.3)
AST SERPL-CCNC: 21 U/L — SIGNIFICANT CHANGE UP (ref 10–40)
BILIRUB DIRECT SERPL-MCNC: 0.1 MG/DL — SIGNIFICANT CHANGE UP (ref 0–0.2)
BILIRUB INDIRECT FLD-MCNC: 0.6 MG/DL — SIGNIFICANT CHANGE UP (ref 0.2–1)
BILIRUB SERPL-MCNC: 0.7 MG/DL — SIGNIFICANT CHANGE UP (ref 0.2–1.2)
BILIRUB SERPL-MCNC: 0.7 MG/DL — SIGNIFICANT CHANGE UP (ref 0.2–1.2)
BILIRUB UR-MCNC: NEGATIVE — SIGNIFICANT CHANGE UP
BUN SERPL-MCNC: 20 MG/DL — HIGH (ref 7–18)
CALCIUM SERPL-MCNC: 8.6 MG/DL — SIGNIFICANT CHANGE UP (ref 8.4–10.5)
CHLORIDE SERPL-SCNC: 104 MMOL/L — SIGNIFICANT CHANGE UP (ref 96–108)
CO2 SERPL-SCNC: 25 MMOL/L — SIGNIFICANT CHANGE UP (ref 22–31)
COLOR SPEC: YELLOW — SIGNIFICANT CHANGE UP
CREAT SERPL-MCNC: 1.77 MG/DL — HIGH (ref 0.5–1.3)
DIFF PNL FLD: ABNORMAL
EPI CELLS # UR: SIGNIFICANT CHANGE UP /HPF
GLUCOSE SERPL-MCNC: 101 MG/DL — HIGH (ref 70–99)
GLUCOSE UR QL: 1000 MG/DL
HCT VFR BLD CALC: 47.3 % — SIGNIFICANT CHANGE UP (ref 39–50)
HGB BLD-MCNC: 15.1 G/DL — SIGNIFICANT CHANGE UP (ref 13–17)
INR BLD: 1.13 RATIO — SIGNIFICANT CHANGE UP (ref 0.88–1.16)
KETONES UR-MCNC: NEGATIVE — SIGNIFICANT CHANGE UP
LEUKOCYTE ESTERASE UR-ACNC: NEGATIVE — SIGNIFICANT CHANGE UP
LIDOCAIN IGE QN: 73 U/L — SIGNIFICANT CHANGE UP (ref 73–393)
MCHC RBC-ENTMCNC: 31.1 PG — SIGNIFICANT CHANGE UP (ref 27–34)
MCHC RBC-ENTMCNC: 31.9 GM/DL — LOW (ref 32–36)
MCV RBC AUTO: 97.3 FL — SIGNIFICANT CHANGE UP (ref 80–100)
NITRITE UR-MCNC: NEGATIVE — SIGNIFICANT CHANGE UP
NRBC # BLD: 0 /100 WBCS — SIGNIFICANT CHANGE UP (ref 0–0)
PH UR: 6 — SIGNIFICANT CHANGE UP (ref 5–8)
PLATELET # BLD AUTO: 259 K/UL — SIGNIFICANT CHANGE UP (ref 150–400)
POTASSIUM SERPL-MCNC: 3.9 MMOL/L — SIGNIFICANT CHANGE UP (ref 3.5–5.3)
POTASSIUM SERPL-SCNC: 3.9 MMOL/L — SIGNIFICANT CHANGE UP (ref 3.5–5.3)
PROT SERPL-MCNC: 7.9 G/DL — SIGNIFICANT CHANGE UP (ref 6–8.3)
PROT UR-MCNC: 500 MG/DL
PROTHROM AB SERPL-ACNC: 12.6 SEC — SIGNIFICANT CHANGE UP (ref 10–12.9)
RBC # BLD: 4.86 M/UL — SIGNIFICANT CHANGE UP (ref 4.2–5.8)
RBC # FLD: 13.3 % — SIGNIFICANT CHANGE UP (ref 10.3–14.5)
RBC CASTS # UR COMP ASSIST: ABNORMAL /HPF (ref 0–2)
SODIUM SERPL-SCNC: 139 MMOL/L — SIGNIFICANT CHANGE UP (ref 135–145)
SP GR SPEC: 1.01 — SIGNIFICANT CHANGE UP (ref 1.01–1.02)
UROBILINOGEN FLD QL: NEGATIVE — SIGNIFICANT CHANGE UP
WBC # BLD: 14.51 K/UL — HIGH (ref 3.8–10.5)
WBC # FLD AUTO: 14.51 K/UL — HIGH (ref 3.8–10.5)
WBC UR QL: SIGNIFICANT CHANGE UP /HPF (ref 0–5)

## 2019-06-27 PROCEDURE — 99284 EMERGENCY DEPT VISIT MOD MDM: CPT

## 2019-06-27 RX ORDER — KETOROLAC TROMETHAMINE 30 MG/ML
15 SYRINGE (ML) INJECTION ONCE
Refills: 0 | Status: DISCONTINUED | OUTPATIENT
Start: 2019-06-27 | End: 2019-06-27

## 2019-06-27 RX ORDER — ONDANSETRON 8 MG/1
4 TABLET, FILM COATED ORAL ONCE
Refills: 0 | Status: COMPLETED | OUTPATIENT
Start: 2019-06-27 | End: 2019-06-27

## 2019-06-27 RX ORDER — SODIUM CHLORIDE 9 MG/ML
1000 INJECTION INTRAMUSCULAR; INTRAVENOUS; SUBCUTANEOUS ONCE
Refills: 0 | Status: COMPLETED | OUTPATIENT
Start: 2019-06-27 | End: 2019-06-27

## 2019-06-27 RX ADMIN — ONDANSETRON 4 MILLIGRAM(S): 8 TABLET, FILM COATED ORAL at 23:38

## 2019-06-27 RX ADMIN — Medication 15 MILLIGRAM(S): at 23:38

## 2019-06-27 RX ADMIN — SODIUM CHLORIDE 1000 MILLILITER(S): 9 INJECTION INTRAMUSCULAR; INTRAVENOUS; SUBCUTANEOUS at 23:39

## 2019-06-27 NOTE — ED PROVIDER NOTE - OBJECTIVE STATEMENT
52 yo M pmh GERD and remote hx of lymphoma presents with 2 days of intermittent R sided abd pain, non radiating. Associated with urinary frequency, constipation (last BM a few days ago) and nausea. PSH hernia repair with mesh. Denies other acute complaints.

## 2019-06-27 NOTE — ED PROVIDER NOTE - CLINICAL SUMMARY MEDICAL DECISION MAKING FREE TEXT BOX
54 yo M with abd pain x 2 days. Possibly obstruction vs renal colic vs biliary colic vs other etiology. Will check UA, labs, CT, meds, reassess.

## 2019-06-27 NOTE — ED PROVIDER NOTE - PROGRESS NOTE DETAILS
labs - WBC 14, SAQIB vs CKD  CT - 3 mm R renal stone with mild hydro   Pain controlled. Tolerating PO. No infection on UA. Will dc with urology fu. Discussed indications for patient return to ED. Patient understood.

## 2019-06-27 NOTE — ED PROVIDER NOTE - NS ED ROS FT
CONSTITUTIONAL: no fever, no chills   EYES: no visual changes, no eye pain   ENMT: no nasal congestion, no throat pain  CARDIOVASCULAR: no chest pain, no edema, no palpitations   RESPIRATORY: no shortness of breath, no cough   GASTROINTESTINAL: +abdominal pain, +nausea, no vomiting, no diarrhea, +constipation   GENITOURINARY: no dysuria, +frequency  MUSCULOSKELETAL: no joint pains, no myalgias, no back pain   SKIN: no rashes  NEUROLOGICAL: no weakness, no headache, no dizziness, no slurred speech, no syncope   PSYCHIATRIC: no known mental health illness   HEME/LYMPH: no lymphadenopathy      All other ROS negative except as per HPI

## 2019-06-28 VITALS
RESPIRATION RATE: 18 BRPM | TEMPERATURE: 98 F | DIASTOLIC BLOOD PRESSURE: 79 MMHG | SYSTOLIC BLOOD PRESSURE: 128 MMHG | OXYGEN SATURATION: 96 % | HEART RATE: 77 BPM

## 2019-06-28 PROBLEM — K21.9 GASTRO-ESOPHAGEAL REFLUX DISEASE WITHOUT ESOPHAGITIS: Chronic | Status: ACTIVE | Noted: 2017-05-04

## 2019-06-28 PROCEDURE — 86850 RBC ANTIBODY SCREEN: CPT

## 2019-06-28 PROCEDURE — 80053 COMPREHEN METABOLIC PANEL: CPT

## 2019-06-28 PROCEDURE — 74177 CT ABD & PELVIS W/CONTRAST: CPT | Mod: 26

## 2019-06-28 PROCEDURE — 86901 BLOOD TYPING SEROLOGIC RH(D): CPT

## 2019-06-28 PROCEDURE — 86900 BLOOD TYPING SEROLOGIC ABO: CPT

## 2019-06-28 PROCEDURE — 85027 COMPLETE CBC AUTOMATED: CPT

## 2019-06-28 PROCEDURE — 74177 CT ABD & PELVIS W/CONTRAST: CPT

## 2019-06-28 PROCEDURE — 85610 PROTHROMBIN TIME: CPT

## 2019-06-28 PROCEDURE — 81001 URINALYSIS AUTO W/SCOPE: CPT

## 2019-06-28 PROCEDURE — 36415 COLL VENOUS BLD VENIPUNCTURE: CPT

## 2019-06-28 PROCEDURE — 83690 ASSAY OF LIPASE: CPT

## 2019-06-28 PROCEDURE — 96374 THER/PROPH/DIAG INJ IV PUSH: CPT | Mod: XU

## 2019-06-28 PROCEDURE — 96375 TX/PRO/DX INJ NEW DRUG ADDON: CPT

## 2019-06-28 PROCEDURE — 82248 BILIRUBIN DIRECT: CPT

## 2019-06-28 PROCEDURE — 99284 EMERGENCY DEPT VISIT MOD MDM: CPT | Mod: 25

## 2019-06-28 PROCEDURE — 85730 THROMBOPLASTIN TIME PARTIAL: CPT

## 2019-06-28 PROCEDURE — 87086 URINE CULTURE/COLONY COUNT: CPT

## 2019-06-29 LAB
CULTURE RESULTS: NO GROWTH — SIGNIFICANT CHANGE UP
SPECIMEN SOURCE: SIGNIFICANT CHANGE UP

## 2019-11-25 NOTE — ED ADULT TRIAGE NOTE - PAIN RATING/NUMBER SCALE (0-10): ACTIVITY
PRE-OP EVALUATION    Patient Name: Dirk Brock    Pre-op Diagnosis: EPIGASTRIC PAIN, NAUSEA    Procedure(s):  ESOPHAGOGASTRODUODENOSCOPY    Surgeon(s) and Role:     Cordell Sanchez MD - Primary    Pre-op vitals reviewed.   Temp: 98.4 °F (36.9 °C) 7

## 2020-08-06 NOTE — ED PROVIDER NOTE - NS ED MD SCRIBE ATTENDING SCRIBE SECTIONS
VITAL SIGNS( Pullset)/DISPOSITION/REVIEW OF SYSTEMS/HISTORY OF PRESENT ILLNESS/HIV no known mental health issues.

## 2022-11-16 NOTE — H&P ADULT. - PROBLEM SELECTOR PROBLEM 2
Patient instructed to return to the ED or call 911 if patient experiences SI, HI, hopelessness, worsening of symptoms or has any other concerns.  Dyspnea on exertion